# Patient Record
Sex: FEMALE | Race: WHITE | NOT HISPANIC OR LATINO | URBAN - NONMETROPOLITAN AREA
[De-identification: names, ages, dates, MRNs, and addresses within clinical notes are randomized per-mention and may not be internally consistent; named-entity substitution may affect disease eponyms.]

---

## 2018-04-23 PROBLEM — H52.03: Noted: 2018-04-23

## 2018-04-23 PROBLEM — H52.221: Noted: 2018-04-23

## 2021-03-02 ENCOUNTER — IMPORTED ENCOUNTER (OUTPATIENT)
Dept: URBAN - NONMETROPOLITAN AREA CLINIC 1 | Facility: CLINIC | Age: 36
End: 2021-03-02

## 2021-03-02 PROCEDURE — 92310 CONTACT LENS FITTING OU: CPT

## 2021-03-02 PROCEDURE — S0621 ROUTINE OPHTHALMOLOGICAL EXA: HCPCS

## 2021-03-02 NOTE — PATIENT DISCUSSION
Astigmatism-Discussed diagnosis with patient. Updated spec Rx given. Recommend lens that will provide comfort as well as protect safety and health of eyes. CL wear-CLs fit and center well.-Stressed that patient should not sleep in CL. -Updated CL Rx given. -CL care and precautions given.

## 2021-12-07 NOTE — PATIENT DISCUSSION
MULTIPLE EXPOSED SUTURES AT BOTH SIDES OF LIMBUS- REMOVED AT SLIP LAMP.  WILL PRESCRIBE LOTEMAX OS TID AND PRED FORTE OS TID TO RESTART FOR AT LEAST 2 WEEKS.  WILL RECHECK IN MT P IN ROUGHLY 2 WEEKS.

## 2021-12-07 NOTE — PROCEDURE NOTE: CLINICAL
PROCEDURE NOTE: Removal of Conjunctival FB, Embedded OS. Diagnosis: Pseudophakia. Anesthesia: Topical. Prior to treatment, the risks/benefits/alternatives were discussed. The patient wished to proceed with procedure. Embedded conjunctival FB removed with forcep/needle. Globe and conjunctiva intact. Patient tolerated procedure well. There were no complications. Post procedure instructions given. Hodan Haskins

## 2022-02-15 NOTE — PATIENT DISCUSSION
PATIENT ONLY USING LUMIGAN OD QHS.  BURNS TERRIBLY WITH LUMIGAN IN OS SO ISN'T USING IN THAT EYE. PATIENT HAS BEEN USING LOTEMAX OS BUT WILL STOP IT. VF TODAY LOOKS NORMAL.

## 2022-04-09 ASSESSMENT — TONOMETRY
OD_IOP_MMHG: 16
OS_IOP_MMHG: 16

## 2022-04-09 ASSESSMENT — VISUAL ACUITY
OS_CC: 20/25
OD_CC: 20/25 BLURRY

## 2022-08-02 NOTE — PATIENT DISCUSSION
PATIENT ONLY USING LATANOPROST OD QHS.  BURNS TERRIBLY WITH LUMIGAN IN OS SO ISN'T USING IN THAT EYE. VISUAL FIELD WAS NORMAL OS LAST TIME.  WILL MONITOR WITHOUT TREATMENT.

## 2023-07-24 ENCOUNTER — COMPREHENSIVE EXAM (OUTPATIENT)
Dept: RURAL CLINIC 1 | Facility: CLINIC | Age: 38
End: 2023-07-24

## 2023-07-24 DIAGNOSIS — H52.03: ICD-10-CM

## 2023-07-24 PROCEDURE — 92014 COMPRE OPH EXAM EST PT 1/>: CPT

## 2023-07-24 PROCEDURE — 92015 DETERMINE REFRACTIVE STATE: CPT

## 2023-07-24 ASSESSMENT — VISUAL ACUITY
OS_SC: 20/20
OU_SC: 20/20
OU_SC: 20/20
OS_SC: 20/20
OD_SC: 20/20
OD_SC: 20/25

## 2023-07-24 ASSESSMENT — TONOMETRY
OD_IOP_MMHG: 16
OS_IOP_MMHG: 16